# Patient Record
Sex: MALE | Race: WHITE | Employment: FULL TIME | URBAN - METROPOLITAN AREA
[De-identification: names, ages, dates, MRNs, and addresses within clinical notes are randomized per-mention and may not be internally consistent; named-entity substitution may affect disease eponyms.]

---

## 2018-01-19 ENCOUNTER — NURSE ONLY (OUTPATIENT)
Dept: FAMILY MEDICINE CLINIC | Facility: CLINIC | Age: 18
End: 2018-01-19

## 2018-01-19 ENCOUNTER — TELEPHONE (OUTPATIENT)
Dept: FAMILY MEDICINE CLINIC | Facility: CLINIC | Age: 18
End: 2018-01-19

## 2018-01-19 VITALS
HEART RATE: 72 BPM | OXYGEN SATURATION: 98 % | RESPIRATION RATE: 18 BRPM | BODY MASS INDEX: 23 KG/M2 | SYSTOLIC BLOOD PRESSURE: 102 MMHG | TEMPERATURE: 99 F | WEIGHT: 164.69 LBS | DIASTOLIC BLOOD PRESSURE: 60 MMHG

## 2018-01-19 DIAGNOSIS — R68.89 FLU-LIKE SYMPTOMS: Primary | ICD-10-CM

## 2018-01-19 PROCEDURE — 99213 OFFICE O/P EST LOW 20 MIN: CPT | Performed by: NURSE PRACTITIONER

## 2018-01-19 RX ORDER — OSELTAMIVIR PHOSPHATE 75 MG/1
75 CAPSULE ORAL 2 TIMES DAILY
Qty: 10 CAPSULE | Refills: 0 | Status: SHIPPED | OUTPATIENT
Start: 2018-01-19 | End: 2018-01-24 | Stop reason: ALTCHOICE

## 2018-01-19 NOTE — PROGRESS NOTES
CHIEF COMPLAINT:   Patient presents with:  Viral Syndrome      HPI:   Colt Alvarado is a 21year old male who presents for upper respiratory symptoms for  1 days. Patient reports dry cough, chest pain from coughing, HA, fever, chills, diarrhea x1 episode. CARDIO: RRR without murmur. LYMPH: No lymphadenopathy. ASSESSMENT AND PLAN:   Ladarius Corrales is a 21year old male who presents with     ASSESSMENT: Flu-like symptoms  (primary encounter diagnosis)    PLAN: Meds as below.   Comfort care as described i Flu symptoms tend to come on quickly and may last a few days to a few weeks.  They include:  · Fever usually higher than 100.4°F  (38°C) and chills  · Sore throat and headache  · Dry cough  · Runny nose  · Tiredness and weakness  · Muscle aches  Who is at r · Ask your healthcare provider if others in your household should get antiviral medicine to help them avoid infection. How can the flu be prevented? · One of the best ways to avoid the flu is to get a flu vaccine each year.  The virus that causes the flu · Rub your hands together briskly, cleaning the backs of your hands, the palms, between your fingers, and up the wrists. · Rub until the gel is gone and your hands are completely dry.   Preventing the flu in healthcare settings  The flu is a special concer

## 2018-01-19 NOTE — PATIENT INSTRUCTIONS
The Flu (Influenza)     The virus that causes the flu spreads through the air in droplets when someone who has the flu coughs, sneezes, laughs, or talks. The flu (influenza) is an infection that affects your respiratory tract.  This tract is made up of The flu usually gets better after 7 days or so. In some cases, your healthcare provider may prescribe an antiviral medicine. This may help you get well a little sooner.  For the medicine to help, you need to take it as soon as possible (ideally within 48 ho · One of the best ways to avoid the flu is to get a flu vaccine each year. The virus that causes the flu changes from year to year. For that reason, healthcare providers recommend getting the flu vaccine each year, as soon as it's available in your area.  Davion Vaughan · Rub until the gel is gone and your hands are completely dry. Preventing the flu in healthcare settings  The flu is a special concern for people in hospitals and long-term care facilities.  To help prevent the spread of flu, many hospitals and nursing shanna

## 2018-01-19 NOTE — TELEPHONE ENCOUNTER
Calling mom, Renee Corona, symptoms started on Wednesday,   Feels nauseous, chest hurts, feels like his heart is racing. She is not sure if he has a fever    Mom is at work and has not seen her son today.  I recommended WIC or UC since we do not have any opening

## 2018-01-24 ENCOUNTER — TELEPHONE (OUTPATIENT)
Dept: FAMILY MEDICINE CLINIC | Facility: CLINIC | Age: 18
End: 2018-01-24

## 2018-01-24 ENCOUNTER — OFFICE VISIT (OUTPATIENT)
Dept: FAMILY MEDICINE CLINIC | Facility: CLINIC | Age: 18
End: 2018-01-24

## 2018-01-24 VITALS
TEMPERATURE: 98 F | SYSTOLIC BLOOD PRESSURE: 110 MMHG | HEART RATE: 82 BPM | BODY MASS INDEX: 23.03 KG/M2 | DIASTOLIC BLOOD PRESSURE: 80 MMHG | WEIGHT: 164.5 LBS | HEIGHT: 71 IN | OXYGEN SATURATION: 98 %

## 2018-01-24 DIAGNOSIS — J18.9 COMMUNITY ACQUIRED PNEUMONIA OF RIGHT LOWER LOBE OF LUNG: Primary | ICD-10-CM

## 2018-01-24 DIAGNOSIS — R05.9 COUGH: ICD-10-CM

## 2018-01-24 PROCEDURE — 99214 OFFICE O/P EST MOD 30 MIN: CPT | Performed by: FAMILY MEDICINE

## 2018-01-24 RX ORDER — LEVOFLOXACIN 500 MG/1
500 TABLET, FILM COATED ORAL DAILY
Qty: 10 TABLET | Refills: 0 | Status: SHIPPED | OUTPATIENT
Start: 2018-01-24 | End: 2018-02-03

## 2018-01-24 NOTE — PROGRESS NOTES
Vern Diaz is a 21year old male. Patient presents with: Other: headache, vomiting-started today, cough that started a couple days ago-went to [facility] and was dx'ed with influenza. ... Julito Hester room 1      HPI:   Patient was seen in the walk-in clinic on t Tylenol. Discussed danger signs including increased fever,chills, SOB and need to call if arise. RTC in 24-48 hrs if no improvement or anytime PRN. Note written to excuse him from work as well as from [employer] training.   He needs to rest for the next

## 2018-01-24 NOTE — TELEPHONE ENCOUNTER
Calling the   Vomiting, headache, coughing- coughing stuff up, light headed  Vomiting started today   He has had the headache off & on for about a week.        Patient was at the [facility] on 1/19/18 and they prescribed Tamiflu     I did advise that the doctor is

## 2018-01-24 NOTE — TELEPHONE ENCOUNTER
Calling Hanane Truong-   She will call him and if he can't drive she will go get him.  She works here in town     Future Appointments  Date Time Provider Josephine Mireles   1/24/2018 11:00 AM Lynda Covington DO [facility]

## 2018-01-24 NOTE — TELEPHONE ENCOUNTER
STILL NOT FEELING WELL, SEEN [facility] ON [facility]ID[facility], WANTS APPT TODAY, NO OPENINGS, CALL MOM

## 2018-12-10 ENCOUNTER — TELEPHONE (OUTPATIENT)
Dept: FAMILY MEDICINE CLINIC | Facility: CLINIC | Age: 18
End: 2018-12-10

## 2018-12-10 NOTE — TELEPHONE ENCOUNTER
Calling the patient-     He received both his hepatitis B and flu shot yesterday.  He needs to schedule his Hep B #2/#3    Future Appointments   Date Time Provider Josephine Mireles   1/7/2019  2:00 PM [facility] [place]   6/10/2019  2:00

## 2018-12-26 ENCOUNTER — OFFICE VISIT (OUTPATIENT)
Dept: FAMILY MEDICINE CLINIC | Facility: CLINIC | Age: 18
End: 2018-12-26
Payer: OTHER GOVERNMENT

## 2018-12-26 VITALS
HEIGHT: 71 IN | HEART RATE: 108 BPM | TEMPERATURE: 99 F | OXYGEN SATURATION: 98 % | BODY MASS INDEX: 23.66 KG/M2 | WEIGHT: 169 LBS | SYSTOLIC BLOOD PRESSURE: 112 MMHG | DIASTOLIC BLOOD PRESSURE: 68 MMHG

## 2018-12-26 DIAGNOSIS — J02.9 PHARYNGITIS, UNSPECIFIED ETIOLOGY: Primary | ICD-10-CM

## 2018-12-26 PROCEDURE — 99213 OFFICE O/P EST LOW 20 MIN: CPT | Performed by: FAMILY MEDICINE

## 2018-12-26 PROCEDURE — 87081 CULTURE SCREEN ONLY: CPT | Performed by: FAMILY MEDICINE

## 2018-12-26 RX ORDER — AZITHROMYCIN 250 MG/1
TABLET, FILM COATED ORAL
Qty: 6 TABLET | Refills: 0 | Status: SHIPPED | OUTPATIENT
Start: 2018-12-26

## 2018-12-26 NOTE — PROGRESS NOTES
Timo Tucker is a 19year old male. Patient presents with: Other: body aches, fatigue, sore throat-started on 12/23-has been taking dayquil and nyquil. ...room 1      HPI:   Sore throat, pain with swallowing, glamds swollen. No headache.     No current out Sig: Take two tablets by mouth today, then one tablet daily.        Imaging & Consults:  None

## 2019-01-04 ENCOUNTER — TELEPHONE (OUTPATIENT)
Dept: FAMILY MEDICINE CLINIC | Facility: CLINIC | Age: 19
End: 2019-01-04

## 2019-01-04 NOTE — TELEPHONE ENCOUNTER
Future Appointments   Date Time Provider Josephine Mireles   1/7/2019  2:00 PM [facility] NURSE [facility] [place]   6/10/2019  2:00 PM [facility] [place]     Patient has an upcoming appointment for his 2nd Hepatitis B shot - please ad

## 2019-01-05 ENCOUNTER — TELEPHONE (OUTPATIENT)
Dept: FAMILY MEDICINE CLINIC | Facility: CLINIC | Age: 19
End: 2019-01-05

## 2019-01-05 NOTE — TELEPHONE ENCOUNTER
Called and spoke to mom and I advised that if he has it documented we should have that for his chart.    Mom will let him know

## 2019-01-05 NOTE — TELEPHONE ENCOUNTER
CHIVO GOT HIS FIRST HEP B SHOT 12/9/2018 WITH [employer], DOES HE NEED TO BRING IN DOCUMATION THAT HE HAD GOTTEN IT ALREADY?

## 2019-01-07 ENCOUNTER — NURSE ONLY (OUTPATIENT)
Dept: FAMILY MEDICINE CLINIC | Facility: CLINIC | Age: 19
End: 2019-01-07
Payer: OTHER GOVERNMENT

## 2019-01-07 DIAGNOSIS — Z92.29 HAS RECEIVED SECOND DOSE OF HEPATITIS B VACCINE: Primary | ICD-10-CM

## 2019-01-07 PROCEDURE — 90746 HEPB VACCINE 3 DOSE ADULT IM: CPT | Performed by: FAMILY MEDICINE

## 2019-01-07 PROCEDURE — 90471 IMMUNIZATION ADMIN: CPT | Performed by: FAMILY MEDICINE

## 2019-01-16 ENCOUNTER — MED REC SCAN ONLY (OUTPATIENT)
Dept: FAMILY MEDICINE CLINIC | Facility: CLINIC | Age: 19
End: 2019-01-16

## 2019-01-21 ENCOUNTER — TELEPHONE (OUTPATIENT)
Dept: FAMILY MEDICINE CLINIC | Facility: CLINIC | Age: 19
End: 2019-01-21

## 2019-01-21 NOTE — TELEPHONE ENCOUNTER
Dr. Esquivel Overcast, please place order for 3rd Hep B. Vaccine. Pt has appointment.       Future Appointments   Date Time Provider Josephine Mireles   6/10/2019  2:00 PM [facility] NURSE [facility] [place]

## 2019-06-10 ENCOUNTER — NURSE ONLY (OUTPATIENT)
Dept: FAMILY MEDICINE CLINIC | Facility: CLINIC | Age: 19
End: 2019-06-10
Payer: OTHER GOVERNMENT

## 2019-06-10 DIAGNOSIS — Z92.29 HAS RECEIVED THIRD DOSE OF HEPATITIS B VACCINE: Primary | ICD-10-CM

## 2019-06-10 PROCEDURE — 90746 HEPB VACCINE 3 DOSE ADULT IM: CPT | Performed by: FAMILY MEDICINE

## 2019-06-10 PROCEDURE — 90471 IMMUNIZATION ADMIN: CPT | Performed by: FAMILY MEDICINE

## 2021-12-17 ENCOUNTER — OFFICE VISIT (OUTPATIENT)
Dept: FAMILY MEDICINE CLINIC | Facility: CLINIC | Age: 21
End: 2021-12-17
Payer: COMMERCIAL

## 2021-12-17 VITALS
OXYGEN SATURATION: 98 % | SYSTOLIC BLOOD PRESSURE: 100 MMHG | TEMPERATURE: 96 F | RESPIRATION RATE: 14 BRPM | WEIGHT: 204 LBS | HEART RATE: 97 BPM | BODY MASS INDEX: 27.63 KG/M2 | HEIGHT: 72 IN | DIASTOLIC BLOOD PRESSURE: 72 MMHG

## 2021-12-17 DIAGNOSIS — Z20.822 SUSPECTED 2019 NOVEL CORONAVIRUS INFECTION: Primary | ICD-10-CM

## 2021-12-17 PROCEDURE — 3008F BODY MASS INDEX DOCD: CPT | Performed by: NURSE PRACTITIONER

## 2021-12-17 PROCEDURE — 3078F DIAST BP <80 MM HG: CPT | Performed by: NURSE PRACTITIONER

## 2021-12-17 PROCEDURE — 3074F SYST BP LT 130 MM HG: CPT | Performed by: NURSE PRACTITIONER

## 2021-12-17 PROCEDURE — 99213 OFFICE O/P EST LOW 20 MIN: CPT | Performed by: NURSE PRACTITIONER

## 2021-12-17 NOTE — PROGRESS NOTES
CHIEF COMPLAINT:   Patient presents with:  Flu      HPI:   Charley Castanon is a 19year old male who presents for upper respiratory symptoms for  2 days. Patient reports tired, sweaty, cough, body aches. Symptoms have been persistent since onset.   Treating sy PLAN:   Nicholas Souza is a 19year old male who presents with upper respiratory symptoms that are consistent with    ASSESSMENT:   Suspected 2019 novel coronavirus infection  (primary encounter diagnosis)    PLAN:   covid alinity  Rest at home until results

## 2021-12-18 NOTE — PATIENT INSTRUCTIONS
Coronavirus Disease 2019 (COVID-19)     [facility] is committed to the safety and well-being of our patients, members, employees, and communities.  As concerns arise about the new strain of coronavirus that causes COVID-19, [facility] exposure  • After day 7 from date of last exposure with a negative test result (test must occur on day 5 or later)  After stopping quarantine, you should  • Watch for symptoms until 14 days after exposure.   • If you have symptoms, immediately self-isolate Care     If you are awaiting test results or are confirmed positive for COVID -19, and your symptoms worsen at home with symptoms such as: extreme weakness, difficult breathing, or unrelenting fevers greater than 100.4 degrees Fahrenheit, you should contac Follow-up  If you are diagnosed with COVID, refrain from exercise until approved by your primary care provider. Please call your primary care provider within 2 days of your discharge to arrange for a telehealth follow-up.  CDC does not recommend repeat test Control & Prevention (CDC)  10 things you can do to manage your health at home, [place].nl. pdf  [employer] Retrieved March 17, 2021, from https://health.[facility]/coronavirus/covid-19-information/covid-19-long-haulers. html  Long-term effects of covid-19. (n.d.).  Retrieved May 11, 2021, from [employer]

## 2022-06-20 ENCOUNTER — OFFICE VISIT (OUTPATIENT)
Dept: FAMILY MEDICINE CLINIC | Facility: CLINIC | Age: 22
End: 2022-06-20
Payer: COMMERCIAL

## 2022-06-20 ENCOUNTER — TELEPHONE (OUTPATIENT)
Dept: FAMILY MEDICINE CLINIC | Facility: CLINIC | Age: 22
End: 2022-06-20

## 2022-06-20 VITALS
RESPIRATION RATE: 16 BRPM | BODY MASS INDEX: 26.87 KG/M2 | DIASTOLIC BLOOD PRESSURE: 68 MMHG | HEIGHT: 72 IN | HEART RATE: 93 BPM | WEIGHT: 198.38 LBS | TEMPERATURE: 98 F | OXYGEN SATURATION: 97 % | SYSTOLIC BLOOD PRESSURE: 116 MMHG

## 2022-06-20 DIAGNOSIS — L73.9 FOLLICULITIS: Primary | ICD-10-CM

## 2022-06-20 PROCEDURE — 99213 OFFICE O/P EST LOW 20 MIN: CPT | Performed by: FAMILY MEDICINE

## 2022-06-20 PROCEDURE — 3008F BODY MASS INDEX DOCD: CPT | Performed by: FAMILY MEDICINE

## 2022-06-20 PROCEDURE — 3078F DIAST BP <80 MM HG: CPT | Performed by: FAMILY MEDICINE

## 2022-06-20 PROCEDURE — 3074F SYST BP LT 130 MM HG: CPT | Performed by: FAMILY MEDICINE

## 2022-06-20 RX ORDER — DOXYCYCLINE HYCLATE 100 MG
100 TABLET ORAL 2 TIMES DAILY
Qty: 20 TABLET | Refills: 0 | Status: SHIPPED | OUTPATIENT
Start: 2022-06-20

## 2022-06-20 RX ORDER — DOXYCYCLINE HYCLATE 100 MG
100 TABLET ORAL 2 TIMES DAILY
Qty: 20 TABLET | Refills: 0 | Status: SHIPPED | OUTPATIENT
Start: 2022-06-20 | End: 2022-06-20

## 2022-06-20 NOTE — TELEPHONE ENCOUNTER
Resent to [facility] in [facility] per request.     Spoke to Hany 264, Shyla Keenan 388 from [place] and canceled prescription.

## 2022-06-20 NOTE — TELEPHONE ENCOUNTER
Tamika Milton is calling due to there insurance the script that was called in today needs to be sent to [facility] in [place] could we please get this changed thank you

## 2024-04-15 ENCOUNTER — OFFICE VISIT (OUTPATIENT)
Dept: FAMILY MEDICINE CLINIC | Facility: CLINIC | Age: 24
End: 2024-04-15
Payer: COMMERCIAL

## 2024-04-15 VITALS
DIASTOLIC BLOOD PRESSURE: 75 MMHG | RESPIRATION RATE: 18 BRPM | TEMPERATURE: 98 F | OXYGEN SATURATION: 98 % | BODY MASS INDEX: 26.28 KG/M2 | WEIGHT: 194 LBS | SYSTOLIC BLOOD PRESSURE: 123 MMHG | HEIGHT: 72 IN | HEART RATE: 74 BPM

## 2024-04-15 DIAGNOSIS — L03.012 PARONYCHIA OF LEFT MIDDLE FINGER: Primary | ICD-10-CM

## 2024-04-15 RX ORDER — SULFAMETHOXAZOLE AND TRIMETHOPRIM 800; 160 MG/1; MG/1
1 TABLET ORAL 2 TIMES DAILY
Qty: 14 TABLET | Refills: 0 | Status: SHIPPED | OUTPATIENT
Start: 2024-04-15 | End: 2024-04-22

## 2024-04-15 NOTE — PATIENT INSTRUCTIONS
Bactrim DS 1 tablet twice daily for 7 days.    Warm water with hydrogen peroxide or epsom salt soaks two to three times daily.   Keep area clean/dry.     Avoid biting nails or trimming cuticles.        Follow up with your primary care provider if your symptoms fail to improve and resolve as anticipated    Go to the Immediate Care or Emergency Department in event of new or worsening symptoms at any time

## 2024-04-15 NOTE — PROGRESS NOTES
CHIEF COMPLAINT:     Chief Complaint   Patient presents with    Finger Pain     Cuticle, started couple days ago   Left hand middle finger        HPI:     Dank Palafox is a 23 year old male who presents with concerns of redness/swelling/pain along cuticle of 3DLH x 2 days.  Initially suspected hangnail, attempted removal without success, (+) purulent drainage with some relief of pressure.  Denies injury/trauma, rarely bites nails.  No h/o MRSA, (+) acne on back.   Denies fever, no chills/sweats, no pain with PROM/AROM of affected digit.   No other concerns.   L hand dominant    Current Outpatient Medications   Medication Sig Dispense Refill    sulfamethoxazole-trimethoprim -160 MG Oral Tab per tablet Take 1 tablet by mouth 2 (two) times daily for 7 days. 14 tablet 0      No past medical history on file.   Social History:  Social History     Socioeconomic History    Marital status: Single   Tobacco Use    Smoking status: Never    Smokeless tobacco: Never   Vaping Use    Vaping status: Every Day    Substances: Nicotine   Substance and Sexual Activity    Alcohol use: No    Drug use: No        REVIEW OF SYSTEMS:   GENERAL: feels well otherwise, no fever, no chills.  SKIN: as above.  CHEST: no chest pains, no palpitations.  LUNGS: denies shortness of breath with exertion or rest. No wheezing, no cough.  LYMPH: no enlargement of the lymph nodes.  MUSC/SKEL: no joint swelling, no joint stiffness.  NEURO: no abnormal sensation, no tingling of the skin or numbness.    EXAM:   /75   Pulse 74   Temp 97.8 °F (36.6 °C)   Resp 18   Ht 6' (1.829 m)   Wt 194 lb (88 kg)   SpO2 98%   BMI 26.31 kg/m²   GENERAL: well developed, well nourished,in no apparent distress  SKIN: (+) localized erythema/soft edema proximal and lateral nailfold 3DRH, cap refill < 2 sec, no induration or lymphangitic streaking, no active drainage, no pain with AROM/PROM.    LYMPH  No epitrochlear LAD.     ASSESSMENT AND PLAN:      ASSESSMENT:  Encounter Diagnosis   Name Primary?    Paronychia of left middle finger Yes       PLAN: Skin care discussed with patient. Instructions and Comfort Care as listed in Patient Instructions.  Medication as below.    Requested Prescriptions     Signed Prescriptions Disp Refills    sulfamethoxazole-trimethoprim -160 MG Oral Tab per tablet 14 tablet 0     Sig: Take 1 tablet by mouth 2 (two) times daily for 7 days.     Risks, benefits, side effects of medication explained and discussed.     Patient Instructions    Bactrim DS 1 tablet twice daily for 7 days.    Warm water with hydrogen peroxide or epsom salt soaks two to three times daily.   Keep area clean/dry.     Avoid biting nails or trimming cuticles.        Follow up with your primary care provider if your symptoms fail to improve and resolve as anticipated    Go to the Immediate Care or Emergency Department in event of new or worsening symptoms at any time       The patient indicates understanding of these issues and agrees to the plan.  The patient is asked to see PCP in 3 days if symptoms not improving or for worsening symptoms.      Cynthia Alvarez PA-C

## 2024-07-27 ENCOUNTER — OFFICE VISIT (OUTPATIENT)
Dept: FAMILY MEDICINE CLINIC | Facility: CLINIC | Age: 24
End: 2024-07-27
Payer: COMMERCIAL

## 2024-07-27 VITALS
TEMPERATURE: 98 F | HEART RATE: 83 BPM | DIASTOLIC BLOOD PRESSURE: 82 MMHG | SYSTOLIC BLOOD PRESSURE: 122 MMHG | RESPIRATION RATE: 18 BRPM | WEIGHT: 193 LBS | OXYGEN SATURATION: 97 % | HEIGHT: 72 IN | BODY MASS INDEX: 26.14 KG/M2

## 2024-07-27 DIAGNOSIS — H57.8A2 FOREIGN BODY SENSATION, LEFT EYE: ICD-10-CM

## 2024-07-27 DIAGNOSIS — H57.89 RED EYE: Primary | ICD-10-CM

## 2024-07-27 RX ORDER — TOBRAMYCIN 3 MG/ML
SOLUTION/ DROPS OPHTHALMIC
Qty: 5 ML | Refills: 0 | Status: SHIPPED | OUTPATIENT
Start: 2024-07-27

## 2024-07-27 NOTE — PROGRESS NOTES
CHIEF COMPLAINT:     Chief Complaint   Patient presents with    Eye Problem     Started yesterday        HPI:   Dank Palafox is a 23 year old male who presents with chief complaint of  left eye irritation.  Reports yesterday night developed some irrigation feeling like he need to blink more but this morning was worse when he woke up so he removed his contacts.  He wonders if he scratched the eye Currently the left eye is red, tearing and mildly intermittently painful.  He has a FB sensation under the upper lid. He does not specifically recall getting anything in his eye.  Overall Symptoms get worse in sunlight.  Has noted some mild eyelid swelling but no changes in VA.     He has a history of prior contact related abrasion and refractive error.          Current Outpatient Medications   Medication Sig Dispense Refill    tobramycin 0.3 % Ophthalmic Solution 1-2 gtts in affected eye(s) q 4 hr for 5 days 5 mL 0      No past medical history on file.   No past surgical history on file.   No family history on file.   Social History     Socioeconomic History    Marital status: Single   Tobacco Use    Smoking status: Never    Smokeless tobacco: Never   Vaping Use    Vaping status: Every Day    Substances: Nicotine   Substance and Sexual Activity    Alcohol use: No    Drug use: No         REVIEW OF SYSTEMS:   GENERAL: feels well otherwise  SKIN: no rashes  EYES: See HPI  HENT: denies ear pain, congestion, sore throat  LUNGS: denies shortness of breath or cough  CARDIOVASCULAR: denies chest pain or palpitations   GI: denies N/V/C or abdominal pain  NEURO: denies headaches     EXAM:   /82   Pulse 83   Temp 97.9 °F (36.6 °C)   Resp 18   Ht 6' (1.829 m)   Wt 193 lb (87.5 kg)   SpO2 97%   BMI 26.18 kg/m²   GENERAL: well developed, well nourished,in no apparent distress sitting in a well brightly illuminated room.   SKIN: no rashes,no suspicious lesions  EYES: PERRLA, EOMI, Left conjunctiva is injected and has some  mild tearing.  no purulent.  Mild lid lower edema without focal lesion.  intact vision to snellen chart with his glassed on. 20/30 left eye. Fluorescein stain under black light without magnification did not reveal any obvious corneal injury or uptake. Lid eversion shows no obvious FB.      HENT: atraumatic, normocephalic,ears and throat are clear  NECK: supple, non tender  LUNGS: clear to auscultation bilaterally.   CARDIO: RRR without murmur  LYMPH: no periauricular lymphadenopathy. No cervical lymphadenopathy      ASSESSMENT AND PLAN:   Dank Palafox is a 23 year old male who presents with:    ASSESSMENT:   Encounter Diagnoses   Name Primary?    Red eye Yes    Foreign body sensation, left eye        PLAN:  discussed for now avoid contact use, will start tobramycin gtts as written for infection prophylaxis and have him follow up with optho.  [facility] numbers/addresses provided.  He tells me he can seen his eye doctor today at 2:40.       Hygiene and comfort care as listen in patient instructions.  Medication as listed below     Requested Prescriptions     Signed Prescriptions Disp Refills    tobramycin 0.3 % Ophthalmic Solution 5 mL 0     Si-2 gtts in affected eye(s) q 4 hr for 5 days       The patient is asked to follow up with their PCP

## (undated) NOTE — LETTER
Date: 12/17/2021    Patient Name: Darvin Zapata          To Whom it may concern: This letter has been written at the patient's request. The above patient was seen at the [facility] for treatment of a medical condition.     This patient shoul

## (undated) NOTE — LETTER
Date: 1/19/2018    Patient Name: Missy Patterson          To Whom it may concern: This letter has been written at the patient's request. The above patient was seen at the [facility] for treatment of a medical condition.     This patient should

## (undated) NOTE — LETTER
12/26/18          To Whom It May Concern,    Due to medical illness, please excuse CHIVO BAUM from work today.     Sincerely,    Yahaira Wright D.O., FAAFP

## (undated) NOTE — LETTER
01/24/18          To Whom It May Concern,    Due to medical illness, please excuse CHIVO PARK [address] [place] [address] from work and drill until 1/28/18.     Sincerely,    Lisa Vo D.O., FAAFP

## (undated) NOTE — LETTER
Date: 1/19/2018    Patient Name: Dasia Sloan          To Whom it may concern: This letter has been written at the patient's request. The above patient was seen at the [facility] for treatment of a medical condition.         Sincerely,